# Patient Record
(demographics unavailable — no encounter records)

---

## 2024-11-12 NOTE — HISTORY OF PRESENT ILLNESS
[FreeTextEntry8] : Patient presents for preventative wellness visit.  Lipid Panel, Hgb A1C and BP screening at this time.  Follow up with PCP annually and as needed. [de-identified] : Spoke with pt regarding elevated Lab results- wellness- Hgb A1c.  Referral to PCP/Endocrinology att.

## 2024-11-12 NOTE — HISTORY OF PRESENT ILLNESS
[FreeTextEntry8] : Patient presents for preventative wellness visit.  Lipid Panel, Hgb A1C and BP screening at this time.  Follow up with PCP annually and as needed. [de-identified] : Spoke with pt regarding elevated Lab results- wellness- Hgb A1c.  Referral to PCP/Endocrinology att.

## 2024-11-13 NOTE — PLAN
[FreeTextEntry1] : Shoulder Pain- Left   Plan: Take Ibuprofen as directed, follow up with orthopedic specialist if pain persists Warm compresses and daily stretches as discussed.   Follow up to the wellness center with any questions or concerns.    WHAT YOU NEED TO KNOW:  What do I need to know about shoulder pain? Shoulder pain is a common problem that can affect your daily activities. Pain can be caused by a problem within your shoulder, such as soreness of a tendon or bursa. A tendon is a cord of tough tissue that connects your muscles to your bones. The bursa is a fluid-filled sac that acts as a cushion between a bone and a tendon. Shoulder pain may also be caused by pain that spreads to your shoulder from another part of your body. Shoulder Anatomy  What increases my risk for shoulder pain?  Repeated overhead activities, such as baseball, weight lifting, or swimming  Medical conditions, such as rotator cuff disease, diabetes, or thyroid disorders  A quick increase in the amount or intensity of exercises, or improper technique during exercise  Muscle imbalance or weakness  Trauma or a fall  Age older than 60 How is shoulder pain diagnosed? Your healthcare provider will ask about your pain, including how and when it started. Tell him or her if you have any weakness or if there was an injury. He or she will examine your shoulder and do tests to see how well you can move your shoulder. You may also need any of the following tests:  An x-ray, ultrasound, CT, or MRI may be needed to show the cause of your shoulder pain. You may be given contrast liquid to help the shoulder area show up better in the pictures. Tell the healthcare provider if you have ever had an allergic reaction to contrast liquid. Do not enter the MRI room with anything metal. Metal can cause serious injury. Tell the healthcare provider if you have any metal in or on your body.  An electromyography test measures the electrical activity of your muscles at rest and with movement. How is shoulder pain treated?  Acetaminophen decreases pain and fever. It is available without a doctor's order. Ask how much to take and how often to take it. Follow directions. Read the labels of all other medicines you are using to see if they also contain acetaminophen, or ask your doctor or pharmacist. Acetaminophen can cause liver damage if not taken correctly.  NSAIDs, such as ibuprofen, help decrease swelling, pain, and fever. This medicine is available with or without a doctor's order. NSAIDs can cause stomach bleeding or kidney problems in certain people. If you take blood thinner medicine, always ask your healthcare provider if NSAIDs are safe for you. Always read the medicine label and follow directions.  A steroid injection may help decrease pain and swelling.  Surgery may be needed for long-term pain and loss of function. How can I manage my symptoms?  Apply ice on your shoulder for 20 to 30 minutes every 2 hours or as directed. Use an ice pack, or put crushed ice in a plastic bag. Cover it with a towel before you apply it to your shoulder. Ice helps prevent tissue damage and decreases swelling and pain.  Apply heat if ice does not help your symptoms. Apply heat on your shoulder for 20 to 30 minutes every 2 hours for as many days as directed. Heat helps decrease pain and muscle spasms.  Limit activities as directed. Try to avoid repeated overhead movements.  Go to physical or occupational therapy as directed. A physical therapist teaches you exercises to help improve movement and strength, and to decrease pain. An occupational therapist teaches you skills to help with your daily activities. How can I help prevent shoulder pain?  Maintain a good range of motion in your shoulder. Ask your healthcare provider which exercises you should do on a regular basis after you have healed.  Stretch and strengthen your shoulder. Use proper technique during exercises and sports. When should I seek immediate care?  You have severe pain.  You cannot move your arm or shoulder.  You have numbness or tingling in your shoulder or arm. When should I contact my healthcare provider?  Your pain gets worse or does not go away with treatment.  You have trouble moving your arm or shoulder.  You have questions or concerns about your condition or care. CARE AGREEMENT:  You have the right to help plan your care. Learn about your health condition and how it may be treated. Discuss treatment options with your healthcare providers to decide what care you want to receive. You always have the right to refuse treatment.

## 2024-11-13 NOTE — HISTORY OF PRESENT ILLNESS
[Joint Pain, Localized In The Shoulder] : shoulder [___ Weeks ago] : [unfilled] weeks ago [Episodic] : episodic [Mild] : mild [Heat] : heat [At Night] : at night [Stable] : stable [FreeTextEntry5] : Stretching

## 2024-11-13 NOTE — PHYSICAL EXAM
[No Joint Swelling] : no joint swelling [Grossly Normal Strength/Tone] : grossly normal strength/tone [Normal Station and Gait] : the gait and station were normal [Normal Motor Tone] : the muscle tone was normal [Hypotonia Both Upper Extremities] : there was no flaccidity of the arms  [Paraspasticity] : there was no spasticity of the arms [None] : there was no hypertonicity observed [Involuntary Movements] : no involuntary movements were seen [Normal] : normal gait, coordination grossly intact, no focal deficits and deep tendon reflexes were 2+ and symmetric

## 2024-11-13 NOTE — REVIEW OF SYSTEMS
[Joint Pain] : no joint pain [Muscle Pain] : muscle pain [Muscle Weakness] : no muscle weakness [Back Pain] : no back pain [Joint Swelling] : no joint swelling [Negative] : Neurological [FreeTextEntry9] : Left shoulder pain, back of left shoulder area

## 2025-05-12 NOTE — HISTORY OF PRESENT ILLNESS
[FreeTextEntry8] : Presents with c/o seasonal allergy symptoms- congestion, itchy eyes, postnasal drip.  Denies fever/chills, muscle aches.

## 2025-05-12 NOTE — REVIEW OF SYSTEMS
[Itching] : itching [Earache] : no earache [Hearing Loss] : no hearing loss [Nosebleeds] : no nosebleeds [Postnasal Drip] : postnasal drip [Nasal Discharge] : nasal discharge [Sore Throat] : no sore throat [Hoarseness] : hoarseness [Negative] : Integumentary

## 2025-05-12 NOTE — PHYSICAL EXAM
[Normal Outer Ear/Nose] : the outer ears and nose were normal in appearance [Normal Oropharynx] : the oropharynx was normal [Normal TMs] : both tympanic membranes were normal [Normal] : normal rate, regular rhythm, normal S1 and S2 and no murmur heard

## 2025-05-12 NOTE — PLAN
[FreeTextEntry1] : Patient education: Environmental allergies in adults (The Basics) Written by the doctors and editors at [facility] Please read the Disclaimer at the end of this page.  What are environmental allergies?  Environmental allergies are a group of conditions that can cause sneezing, stuffy or runny nose, and itchy eyes. They are caused by allergies to things in our surroundings, such as in the home and outdoors. Normally, people breathe in these substances without a problem. But when a person has an environmental allergy, their immune system acts as if the substance is harmful to the body. This causes symptoms.  Some people have allergy symptoms all year long. Year-round symptoms are usually caused by allergies to:  Insects, such as dust mites and cockroaches  Animals, such as cats and dogs  Mold spores  Other people have symptoms only during certain times of the year, when the thing that they are allergic to is around. These allergies might be called "seasonal allergies." Some people also use the term "hay fever." Seasonal allergy symptoms are caused by:  Pollens from trees, grasses, or weeds (figure 1)  Mold spores, which are in the air when the weather is humid, or after rain  Many people first get environmental allergies when they are children or young adults. Environmental allergies are lifelong, but symptoms can get better or worse over time. Environmental allergies sometimes run in families.  What are the symptoms of environmental allergies?  Symptoms of environmental allergies can include:  Stuffy nose, runny nose, or sneezing a lot  Itchy or red eyes  Sore throat, or itching of the throat or ears  Waking up at night or trouble sleeping, which can lead to feeling tired during the day  Is there a test for environmental allergies?  Yes. Your doctor will ask about your symptoms and do an exam. They might order other tests, such as allergy skin testing, which can help the doctor figure out what you are allergic to. During a skin test, a doctor will put a drop of the substance that you might be allergic to on your skin, and make a tiny prick in the skin. Then, they will watch your skin to see if it turns red and bumpy.  How are environmental allergies treated?  People with environmental allergies might use 1 or more of the following treatments to help reduce their symptoms:  Nose rinses - Rinsing out the nose with salt water cleans the inside of the nose and gets rid of pollen in the nose. Different devices can be used to rinse the nose.  Steroid nose sprays - Doctors often recommend these sprays first, because they are the best treatment for stuffy nose. Many of these sprays are available without a prescription. Steroid nose sprays work best if you use them every day, and it can take a few days for them to work fully. Steroid nose sprays are more effective than other allergy medicines for stuffy nose and post-nasal drip. (Post-nasal drip is when mucus runs down the back of your throat.)  Antihistamines - These medicines help stop itching, sneezing, and runny nose symptoms. They don't treat stuffy nose as well as steroid nose sprays. Some antihistamines can make people feel tired.  Antihistamine eye drops - These medicines are available without a prescription. They can help with eyes that feel itchy or gritty.  Decongestants - These medicines can reduce stuffy nose symptoms. People with certain health problems, such as high blood pressure, should not take decongestants. Also, people should not use decongestant nose sprays for more than 3 days in a row. Using decongestant nose sprays for more than 3 days in a row can make symptoms worse.  Allergy shots - Some people with environmental allergies choose to get allergy shots. Usually, allergy shots are given every week or month by an allergy doctor. They contain tiny amounts of allergens, such as pollen. Many people find that this treatment reduces their symptoms, but it can take months to work.  Allergy pills (under the tongue) - For some types of pollen allergies, there are pills that work much like allergy shots. These pills need to be prescribed by a doctor. They are made to dissolve under the tongue. They are taken every day for several months of the year.  Talk with your doctor or nurse about the benefits and downsides of the different treatments. The right treatment for you will depend a lot on your symptoms and other health problems. It is also important to talk with your doctor or nurse about when and how to use your medicines.  Can environmental allergy symptoms be prevented?  Yes. If you get symptoms at the same time every year, talk with your doctor or nurse. Some people can prevent symptoms by starting their medicine a week or 2 before that time of the year.  You can also help prevent symptoms by avoiding the things that you are allergic to. For example, if you are allergic to pollen, you can:  Stay inside during the times of the year when you have symptoms.  Keep car and house windows closed, and use air conditioning instead.  Take a shower before bed to rinse pollen off of your hair and skin.  Wear a dust mask if you need to be outside.  If you are allergic to dust, dust mites, mold, or pets, you can:  Wash bedding every week in hot water with detergent, or dry it in a dryer on the hot setting. If possible, use a comforter or blanket that can be washed.  Cover pillows and mattresses with vinyl covers to protect yourself from dust mites.  Use fewer items that collect dust, especially in the bedroom - These include curtains, bed skirts, carpet or rugs, and stuffed animals.  Clean air conditioner and furnace filters regularly.  Vacuum every week using a vacuum with a high-efficiency particulate air ("HEPA") filter.  Keep pets out of the home, if you can - Keeping pets only out of certain rooms might help a bit, but usually does not remove animal allergens from your home.  Bathe dogs each week - This might help reduce your symptoms. Bathing cats will probably not reduce your symptoms.  What if I want to get pregnant?  If you want to get pregnant, talk with your doctor about which medicines are safe to take during pregnancy. Environmental allergy symptoms can get worse, get better, or stay the same during pregnancy.  When should I call the doctor?  Call your doctor or nurse for advice if you:  Have a fever of 100.4F (38C) or higher, or chills  Have green or yellow mucus  These symptoms could mean that you have an infection and not just allergies.